# Patient Record
Sex: FEMALE | Race: WHITE | URBAN - METROPOLITAN AREA
[De-identification: names, ages, dates, MRNs, and addresses within clinical notes are randomized per-mention and may not be internally consistent; named-entity substitution may affect disease eponyms.]

---

## 2017-01-19 ENCOUNTER — TRANSFERRED RECORDS (OUTPATIENT)
Dept: HEALTH INFORMATION MANAGEMENT | Facility: CLINIC | Age: 27
End: 2017-01-19

## 2017-01-19 LAB
C TRACH DNA SPEC QL PROBE+SIG AMP: NEGATIVE
N GONORRHOEA DNA SPEC QL PROBE+SIG AMP: NEGATIVE
PAP-ABSTRACT: NORMAL
SPECIMEN DESCRIP: NORMAL
SPECIMEN DESCRIPTION: NORMAL

## 2017-05-09 ENCOUNTER — OFFICE VISIT (OUTPATIENT)
Dept: FAMILY MEDICINE | Facility: CLINIC | Age: 27
End: 2017-05-09
Payer: COMMERCIAL

## 2017-05-09 VITALS
DIASTOLIC BLOOD PRESSURE: 70 MMHG | HEART RATE: 100 BPM | SYSTOLIC BLOOD PRESSURE: 124 MMHG | HEIGHT: 69 IN | WEIGHT: 203.2 LBS | OXYGEN SATURATION: 98 % | TEMPERATURE: 97.2 F | BODY MASS INDEX: 30.1 KG/M2

## 2017-05-09 DIAGNOSIS — Z20.2 EXPOSURE TO GONORRHEA: ICD-10-CM

## 2017-05-09 DIAGNOSIS — R07.0 THROAT PAIN: Primary | ICD-10-CM

## 2017-05-09 LAB
DEPRECATED S PYO AG THROAT QL EIA: NORMAL
MICRO REPORT STATUS: NORMAL
SPECIMEN SOURCE: NORMAL

## 2017-05-09 PROCEDURE — 87491 CHLMYD TRACH DNA AMP PROBE: CPT | Performed by: PHYSICIAN ASSISTANT

## 2017-05-09 PROCEDURE — 99213 OFFICE O/P EST LOW 20 MIN: CPT | Mod: 25 | Performed by: PHYSICIAN ASSISTANT

## 2017-05-09 PROCEDURE — 87081 CULTURE SCREEN ONLY: CPT | Performed by: PHYSICIAN ASSISTANT

## 2017-05-09 PROCEDURE — 87880 STREP A ASSAY W/OPTIC: CPT | Performed by: PHYSICIAN ASSISTANT

## 2017-05-09 PROCEDURE — 87591 N.GONORRHOEAE DNA AMP PROB: CPT | Performed by: PHYSICIAN ASSISTANT

## 2017-05-09 PROCEDURE — 96372 THER/PROPH/DIAG INJ SC/IM: CPT | Performed by: PHYSICIAN ASSISTANT

## 2017-05-09 NOTE — MR AVS SNAPSHOT
After Visit Summary   5/9/2017    Sheba Joiner    MRN: [id number]           Patient Information     Date Of Birth          1990        Visit Information        Provider Department      5/9/2017 8:40 AM Macie Vasquez PA-C [facility] [place]        Today's Diagnoses     Throat pain    -  1    Exposure to gonorrhea           Follow-ups after your visit        Your next 10 appointments already scheduled     May 25, 2017  3:45 PM CDT   Office Visit with Emily Aleman MD   [facility] [place] [place] ([facility] [place])    [address] [place] [id number] [phone]           Bring a current list of meds and any records pertaining to this visit.  For Physicals, please bring immunization records and any forms needing to be filled out.  Please arrive 10 minutes early to complete paperwork.              Who to contact     If you have questions or need follow up information about today's clinic visit or your schedule please contact [facility] [place] directly at [phone].  Normal or non-critical lab and imaging results will be communicated to you by CareSharehart, letter or phone within 4 business days after the clinic has received the results. If you do not hear from us within 7 days, please contact the clinic through [employer] or phone. If you have a critical or abnormal lab result, we will notify you by phone as soon as possible.  Submit refill requests through VuCOMP or call your pharmacy and they will forward the refill request to us. Please allow 3 business days for your refill to be completed.          Additional Information About Your Visit        CareSharehart Information     VuCOMP gives you secure access to your electronic health record. If you see a primary care provider, you can also send messages to your care team and make appointments. If you have questions, please call your primary care clinic.  If you do not have a  "primary care provider, please call [phone] and they will assist you.        Care EveryWhere ID     This is your Care EveryWhere ID. This could be used by other organizations to access your [place] medical records  [id number]        Your Vitals Were     Pulse Temperature Height Pulse Oximetry BMI (Body Mass Index)       100 97.2  F (36.2  C) (Oral) 5' 9\" (1.753 m) 98% 30.01 kg/m2        Blood Pressure from Last 3 Encounters:   05/09/17 124/70   07/11/14 126/84   06/13/14 130/82    Weight from Last 3 Encounters:   05/09/17 203 lb 3.2 oz (92.2 kg)   07/11/14 186 lb 4.8 oz (84.5 kg)   06/13/14 187 lb 6.4 oz (85 kg)              We Performed the Following     Chlamydia trachomatis PCR     Neisseria gonorrhoeae PCR     Strep, Rapid Screen          Today's Medication Changes          These changes are accurate as of: 5/9/17  9:21 AM.  If you have any questions, ask your nurse or doctor.               These medicines have changed or have updated prescriptions.        Dose/Directions    Levonorgestrel 13.5 MG Iud   This may have changed:  additional instructions   Used for:  Encounter for IUD insertion        Dose:  1 Intra Uterine Device   1 each (13.5 mg) by Intrauterine route continuous Inserted today for continuous use to be removed or replaced by July 12, 2014.   Refills:  0                Primary Care Provider    None Specified       No primary provider on file.        Thank you!     Thank you for choosing [facility] [place]  for your care. Our goal is always to provide you with excellent care. Hearing back from our patients is one way we can continue to improve our services. Please take a few minutes to complete the written survey that you may receive in the mail after your visit with us. Thank you!             Your Updated Medication List - Protect others around you: Learn how to safely use, store and throw away your medicines at www.disposemymeds.org.          This list is accurate as of: 5/9/17  " 9:21 AM.  Always use your most recent med list.                   Brand Name Dispense Instructions for use    Levonorgestrel 13.5 MG Iud      1 each (13.5 mg) by Intrauterine route continuous Inserted today for continuous use to be removed or replaced by July 12, 2014.       ranitidine 75 MG tablet   Generic drug:  ranitidine      Take 75 mg by mouth daily as needed.

## 2017-05-09 NOTE — NURSING NOTE
The following medication was given: Ceftriaxone     MEDICATION: Rocephin 250mg and Lidocaine 1cc  ROUTE: IM  SITE: RUQ - Gluteus  DOSE: 250mg  LOT #: [id number]  :  Maryam  EXPIRATION DATE:  06/01/2018  NDC#: 9715-9319-07   Saira Pradhan MA

## 2017-05-09 NOTE — PROGRESS NOTES
SUBJECTIVE:                                                    Sheba Joiner is a 26 year old female who presents to clinic today for the following health issues:    Acute Illness   Acute illness concerns: Sore throat   Onset: Sun      Fever: YES- low grade fever yesterday     Chills/Sweats: no    Headache (location?): no    Sinus Pressure:no    Conjunctivitis:  no    Ear Pain: no    Rhinorrhea: no     Congestion: no     Sore Throat: YES     Cough: no    Wheeze: no    Decreased Appetite: no    Nausea: no    Vomiting: no    Diarrhea:  no    Dysuria/Freq.: no    Fatigue/Achiness: YES    Sick/Strep Exposure: no     Therapies Tried and outcome: nothing     Possible exposure to gonorrhea in throat. No vaginal symptoms -- no discharge, pain with intercourse. Did have vaginal penetration, but with a condom.   Problem list and histories reviewed & adjusted, as indicated.  Additional history: as documented    Patient Active Problem List   Diagnosis     Depression     Encounter for routine gynecological examination     Encounter for GAYLE IUD insertion     Past Surgical History:   Procedure Laterality Date     EXTRACTION(S) DENTAL  age 21       Social History   Substance Use Topics     Smoking status: Never Smoker     Smokeless tobacco: Never Used     Alcohol use Yes      Comment: 2-3 drinks once a  week     Family History   Problem Relation Age of Onset     CEREBROVASCULAR DISEASE Paternal Grandfather 75     CEREBROVASCULAR DISEASE Maternal Grandmother 75     Cardiovascular Father 50     DVT when obese     Hypertension Father 45     resolved w wt loss     CANCER Maternal Grandfather 75     Bladder     DIABETES Father 58     type II     Breast Cancer No family hx of      Cancer - colorectal No family hx of      Prostate Cancer No family hx of      Alcohol/Drug No family hx of      Psychotic Disorder No family hx of          Current Outpatient Prescriptions   Medication Sig Dispense Refill     Levonorgestrel 13.5 MG IUD  "1 each (13.5 mg) by Intrauterine route continuous Inserted today for continuous use to be removed or replaced by July 12, 2014. (Patient taking differently: 1 Intra Uterine Device by Intrauterine route continuous Inserted 7/11/14 for continuous use to be removed or replaced by July 12, 2017, NOT, 2014. ksl)       ranitidine (ZANTAC 75) 75 MG tablet Take 75 mg by mouth daily as needed.       Allergies   Allergen Reactions     Cats      No Known Drug Allergy        Reviewed and updated as needed this visit by clinical staff       Reviewed and updated as needed this visit by Provider       ROS:  Constitutional, HEENT, cardiovascular, pulmonary, GI, , musculoskeletal, neuro, skin, endocrine and psych systems are negative, except as otherwise noted.    OBJECTIVE:                                                    /70  Pulse 100  Temp 97.2  F (36.2  C) (Oral)  Ht 5' 9\" (1.753 m)  Wt 203 lb 3.2 oz (92.2 kg)  SpO2 98%  BMI 30.01 kg/m2  Body mass index is 30.01 kg/(m^2).  GENERAL: healthy, alert and no distress  EYES: Eyes grossly normal to inspection, PERRL and conjunctivae and sclerae normal  HENT: normal cephalic/atraumatic, ear canals and TM's normal, nose and mouth without ulcers or lesions, oral mucous membranes moist, tonsillar hypertrophy and tonsillar erythema  NECK: cervical adenopathy B/L, no asymmetry, masses, or scars and thyroid normal to palpation  RESP: lungs clear to auscultation - no rales, rhonchi or wheezes  CV: regular rate and rhythm, normal S1 S2, no S3 or S4, no murmur, click or rub, no peripheral edema and peripheral pulses strong  ABDOMEN: soft, nontender, no hepatosplenomegaly, no masses and bowel sounds normal  MS: no gross musculoskeletal defects noted, no edema    Diagnostic Test Results:  Results for orders placed or performed in visit on 05/09/17 (from the past 24 hour(s))   Strep, Rapid Screen   Result Value Ref Range    Specimen Description Throat     Rapid Strep A Screen       " NEGATIVE: No Group A streptococcal antigen detected by immunoassay, await   culture report.      Micro Report Status FINAL 05/09/2017         ASSESSMENT/PLAN:                                                    1. Throat pain  Will call if strep culture positive. IBU 600mg THREE TIMES PER DAY, salt water gargles and lots of fluids.  - Strep, Rapid Screen  - CEFTRIAXONE NA INJ /250MG  - INJECTION INTRAMUSCULAR OR SUB-Q  - Beta strep group A culture    2. Exposure to gonorrhea  Will call if G/C positive. Went over safe sex practices. Declines HIV testing at this time.   - Neisseria gonorrhoeae PCR  - Chlamydia trachomatis PCR  - CEFTRIAXONE NA INJ /250MG  - INJECTION INTRAMUSCULAR OR SUB-Q    I have discussed the patient's diagnosis and my plan of treatment with the patient. We went over any labs or imaging. Patient is aware to come back in with worsening symptoms or if no relief despite treatment plan.  Patient verbalizes understanding. All questions were addressed and answered.     Macie Vasquez PA-C  [facility] [place]

## 2017-05-09 NOTE — NURSING NOTE
"Chief Complaint   Patient presents with     URI       Initial /70  Pulse 100  Temp 97.2  F (36.2  C) (Oral)  Ht 5' 9\" (1.753 m)  Wt 203 lb 3.2 oz (92.2 kg)  SpO2 98%  BMI 30.01 kg/m2 Estimated body mass index is 30.01 kg/(m^2) as calculated from the following:    Height as of this encounter: 5' 9\" (1.753 m).    Weight as of this encounter: 203 lb 3.2 oz (92.2 kg).  Medication Reconciliation: complete   Saira Pradhan MA      "

## 2017-05-10 LAB
BACTERIA SPEC CULT: NORMAL
C TRACH DNA SPEC QL NAA+PROBE: NORMAL
MICRO REPORT STATUS: NORMAL
N GONORRHOEA DNA SPEC QL NAA+PROBE: ABNORMAL
SPECIMEN SOURCE: ABNORMAL
SPECIMEN SOURCE: NORMAL
SPECIMEN SOURCE: NORMAL

## 2017-05-23 ENCOUNTER — TELEPHONE (OUTPATIENT)
Dept: NURSING | Facility: CLINIC | Age: 27
End: 2017-05-23

## 2017-05-23 DIAGNOSIS — Z11.3 SCREEN FOR STD (SEXUALLY TRANSMITTED DISEASE): ICD-10-CM

## 2017-05-23 PROCEDURE — 86780 TREPONEMA PALLIDUM: CPT | Performed by: OBSTETRICS & GYNECOLOGY

## 2017-05-23 PROCEDURE — 87389 HIV-1 AG W/HIV-1&-2 AB AG IA: CPT | Performed by: OBSTETRICS & GYNECOLOGY

## 2017-05-23 PROCEDURE — 87491 CHLMYD TRACH DNA AMP PROBE: CPT | Performed by: OBSTETRICS & GYNECOLOGY

## 2017-05-23 PROCEDURE — 36415 COLL VENOUS BLD VENIPUNCTURE: CPT | Performed by: OBSTETRICS & GYNECOLOGY

## 2017-05-23 PROCEDURE — 87591 N.GONORRHOEAE DNA AMP PROB: CPT | Performed by: OBSTETRICS & GYNECOLOGY

## 2017-05-23 NOTE — TELEPHONE ENCOUNTER
Completed [facility] form with pt and faxed to MD.   Pt mentioned that she took the azithromycin x 1 dose and developed a rash on wed and had the rash x 10 days. Rash was on neck and chest. Is now clearing. Used benadryl at night and topical benadryl in the am. Rash was little red bumps with small white heads. Bumps were not really itchy. No other symptoms. She took photos of the rash and will bring to her appt on 5/25. Listed azithromycin as allergy.     Lilliam Rodriguez RN  [facility] [place]

## 2017-05-25 ENCOUNTER — OFFICE VISIT (OUTPATIENT)
Dept: OBGYN | Facility: CLINIC | Age: 27
End: 2017-05-25
Payer: COMMERCIAL

## 2017-05-25 VITALS
SYSTOLIC BLOOD PRESSURE: 126 MMHG | BODY MASS INDEX: 30.24 KG/M2 | WEIGHT: 204.2 LBS | HEIGHT: 69 IN | DIASTOLIC BLOOD PRESSURE: 74 MMHG

## 2017-05-25 DIAGNOSIS — Z30.432 ENCOUNTER FOR IUD REMOVAL: ICD-10-CM

## 2017-05-25 DIAGNOSIS — B96.89 BV (BACTERIAL VAGINOSIS): ICD-10-CM

## 2017-05-25 DIAGNOSIS — Z11.3 SCREEN FOR STD (SEXUALLY TRANSMITTED DISEASE): ICD-10-CM

## 2017-05-25 DIAGNOSIS — Z30.430 ENCOUNTER FOR IUD INSERTION: Primary | ICD-10-CM

## 2017-05-25 DIAGNOSIS — N76.0 BV (BACTERIAL VAGINOSIS): ICD-10-CM

## 2017-05-25 LAB
BETA HCG QUAL IFA URINE: NEGATIVE
C TRACH DNA SPEC QL NAA+PROBE: NORMAL
HIV 1+2 AB+HIV1 P24 AG SERPL QL IA: NORMAL
MICRO REPORT STATUS: ABNORMAL
N GONORRHOEA DNA SPEC QL NAA+PROBE: NORMAL
SPECIMEN SOURCE: ABNORMAL
SPECIMEN SOURCE: NORMAL
SPECIMEN SOURCE: NORMAL
T PALLIDUM IGG+IGM SER QL: NEGATIVE
WET PREP SPEC: ABNORMAL

## 2017-05-25 PROCEDURE — 58300 INSERT INTRAUTERINE DEVICE: CPT | Performed by: OBSTETRICS & GYNECOLOGY

## 2017-05-25 PROCEDURE — 84703 CHORIONIC GONADOTROPIN ASSAY: CPT | Performed by: OBSTETRICS & GYNECOLOGY

## 2017-05-25 PROCEDURE — 58301 REMOVE INTRAUTERINE DEVICE: CPT | Performed by: OBSTETRICS & GYNECOLOGY

## 2017-05-25 PROCEDURE — 87210 SMEAR WET MOUNT SALINE/INK: CPT | Performed by: OBSTETRICS & GYNECOLOGY

## 2017-05-25 PROCEDURE — 99212 OFFICE O/P EST SF 10 MIN: CPT | Mod: 25 | Performed by: OBSTETRICS & GYNECOLOGY

## 2017-05-25 RX ORDER — METRONIDAZOLE 500 MG/1
500 TABLET ORAL 2 TIMES DAILY
Qty: 14 TABLET | Refills: 0 | Status: SHIPPED | OUTPATIENT
Start: 2017-05-25 | End: 2017-06-01

## 2017-05-25 NOTE — MR AVS SNAPSHOT
After Visit Summary   5/25/2017    Sheba Joiner    MRN: [id number]           Patient Information     Date Of Birth          1990        Visit Information        Provider Department      5/25/2017 3:45 PM Emily Aleman [place] [facility] [place]        Today's Diagnoses     Encounter for IUD insertion    -  1    Screen for STD (sexually transmitted disease)          Care Instructions    What Mirena Users May Expect    What to watch for right after Mirena is placed  Some women may experience uterine cramps, bleeding, and/or dizziness during and right after Mirena is placed. To help minimize the cramps, you may taken ibuprofen 600 mg with food prior to your appointment. These symptoms should improve over the next 24 hours.  Mild cramping may be present for a few days after your placement  As a follow up, you should check your strings on 4 weeks or visit your clinic once in the first 4 to 12 weeks after Mirena is placed to make sure it is in the right position. After that, Mirena can be checked once a year as part of your routine exam.    Please use a back-up method (abstinence or condoms) for 5 days after placement.    Your periods may change  For the first 3 to 6 months, your monthly period may become irregular. You may also have frequent spotting or light bleeding. A few women have heavy bleeding during this time. After your body adjusts, the number of bleeding days is likely to decrease (but may remain irregular), and you may even find that your periods stop altogether for as long as Mirena is in place. Around the end of the third month of use, you may see up to a 75% reduction in the amount of menstrual bleeding. By one year, about 1 out of 5 users may hay have no period at all. At the end of two years, 70% have little or no bleeding. Your periods will return rapidly once Mirena is removed.     Mirena Strings  You may check your own Mirena strings by inserting a finger into the  vagina and feeling the strings as they exit the cervix.  The strings will initially feel firm, like fishing line, but will soften over a few weeks.  After the strings have softened, you or your partner should not be able to feel the strings during intercourse.  If you can feel the IUD, see your healthcare provider to have the position confirmed.  You may use tampons with Mirena in place.    Mirena does not protect against HIV or STDs.  Mirena does not prevent the formation of ovarian cysts.  Mirena does not typically reduce acne or cause weight gain or mood changes.    Please call [facility] at [phone] if you have questions or concerns.    For more information:  [email or web address]            Follow-ups after your visit        Who to contact     If you have questions or need follow up information about today's clinic visit or your schedule please contact [facility] [place] directly at [phone].  Normal or non-critical lab and imaging results will be communicated to you by WeMonitorhart, letter or phone within 4 business days after the clinic has received the results. If you do not hear from us within 7 days, please contact the clinic through [employer] or phone. If you have a critical or abnormal lab result, we will notify you by phone as soon as possible.  Submit refill requests through [employer] or call your pharmacy and they will forward the refill request to us. Please allow 3 business days for your refill to be completed.          Additional Information About Your Visit        Styloola Information     [employer] gives you secure access to your electronic health record. If you see a primary care provider, you can also send messages to your care team and make appointments. If you have questions, please call your primary care clinic.  If you do not have a primary care provider, please call [phone] and they will assist you.        Care EveryWhere ID     This is your Care EveryWhere  "ID. This could be used by other organizations to access your [place] medical records  [id number]        Your Vitals Were     Height BMI (Body Mass Index)                5' 9\" (1.753 m) 30.16 kg/m2           Blood Pressure from Last 3 Encounters:   05/25/17 126/74   05/09/17 124/70   07/11/14 126/84    Weight from Last 3 Encounters:   05/25/17 204 lb 3.2 oz (92.6 kg)   05/09/17 203 lb 3.2 oz (92.2 kg)   07/11/14 186 lb 4.8 oz (84.5 kg)              We Performed the Following     Beta HCG qual IFA urine     Wet prep          Today's Medication Changes          These changes are accurate as of: 5/25/17  4:06 PM.  If you have any questions, ask your nurse or doctor.               These medicines have changed or have updated prescriptions.        Dose/Directions    Levonorgestrel 13.5 MG Iud   This may have changed:  additional instructions   Used for:  Encounter for IUD insertion        Dose:  1 Intra Uterine Device   1 each (13.5 mg) by Intrauterine route continuous Inserted today for continuous use to be removed or replaced by July 12, 2014.   Refills:  0                Primary Care Provider    None Specified       No primary provider on file.        Thank you!     Thank you for choosing [facility]  for your care. Our goal is always to provide you with excellent care. Hearing back from our patients is one way we can continue to improve our services. Please take a few minutes to complete the written survey that you may receive in the mail after your visit with us. Thank you!             Your Updated Medication List - Protect others around you: Learn how to safely use, store and throw away your medicines at www.disposemymeds.org.          This list is accurate as of: 5/25/17  4:06 PM.  Always use your most recent med list.                   Brand Name Dispense Instructions for use    Levonorgestrel 13.5 MG Iud      1 each (13.5 mg) by Intrauterine route continuous Inserted today for continuous use to " be removed or replaced by July 12, 2014.       ranitidine 75 MG tablet   Generic drug:  ranitidine      Take 75 mg by mouth daily as needed.

## 2017-05-25 NOTE — NURSING NOTE
"Chief Complaint   Patient presents with     IUD     Mirena. NDC: 53353-725-73 LOT: [id number] EXP: 2020       Initial /74  Ht 5' 9\" (1.753 m)  Wt 204 lb 3.2 oz (92.6 kg)  BMI 30.16 kg/m2 Estimated body mass index is 30.16 kg/(m^2) as calculated from the following:    Height as of this encounter: 5' 9\" (1.753 m).    Weight as of this encounter: 204 lb 3.2 oz (92.6 kg).  BP completed using cuff size: regular        The following HM Due: NONE      The following patient reported/Care Every where data was sent to:  P ABSTRACT QUALITY INITIATIVES [[id number]]  Pap smear done on this date: 17 (approximately), by this group: [facility], results were normal.      patient has appointment for today and Pt had pap done elsewhere. Sent to abstraction/added to history       Rose Pinto CMA        "

## 2017-05-25 NOTE — Clinical Note
Pap smear done on this date: 1/19/17 (approximately), by this group: [facility], results were normal.

## 2017-05-25 NOTE — PATIENT INSTRUCTIONS

## 2017-05-28 NOTE — PROGRESS NOTES
"S:  Sheba presents for IUD remove/replace.   Had a Radha, would like Mirena.  Recent positive gonorrhea test, treated, tested negative two days ago.  Wet prep (screening) positive for BV.    O:  Vitals:    17 1556   BP: 126/74   Weight: 204 lb 3.2 oz (92.6 kg)   Height: 5' 9\" (1.753 m)   Body mass index is 30.16 kg/(m^2).     Gen: well appearing  Abd: soft, NT  : normal external genitalia. Cervix non parous. No abnormal discharge.     Procedure IUD REMOVAL:    Bimanual exam was performed.  The IUD strings were palpable.  Speculum introduced with patient in the dorsal lithotomy position.  The IUD string was visualized and grasped.  The IUD was removed easily.  Pt tolerated well.      IUD INSERTION PROCEDURE    Sheba Joiner is a 26 year old female  who presents for Mirena IUD insertion.  Indication for IUD insertion is contraception.  No LMP recorded. Patient is not currently having periods (Reason: IUD). .  The patient is currently using Radha for contraception.  She is not in a monogamous sexual relationship.     Lab Results   Component Value Date    PAP NIL 2014     GC/CT negative 17  Results for orders placed or performed in visit on 17   Beta HCG qual IFA urine   Result Value Ref Range    Beta HCG Qual IFA Urine Negative NEG   Wet prep   Result Value Ref Range    Specimen Description Vagina     Wet Prep (A)      Clue cells seen  No yeast seen  No Trichomonas seen      Micro Report Status FINAL 2017        A complete discussion of the risks and benefits of IUD use and the details of the insertion procedure was held with the patient.    All questions were answered.  A consent form was signed.    Prior to the beginning of the procedure the team paused to verify the patient's identity, as well as the procedure to be performed and the site.  All equipment required was ready and available. The patient was positioned appropriately.     IUD Lot # NDC: [id number] LOT: " [id number] EXP: 01/2020    The patient was placed in low lithotomy.  A bimanual exam was performed and the uterus noted to be anteverted.  A speculum was placed and the cervix swabbed with Betadine.  A tenaculum was placed on the anterior cervical lip. A paracervical block was performed.  The fundus sounded to 7 cm. The Mirena IUD was placed to the uterine fundus without difficulty.  The strings were cut to 3 cms.  The tenaculum was removed and hemostasis was ensured.  The speculum was removed.  The patient tolerated the procedure well.    PLAN:   The patient was asked to contact the clinic for any fever/chills/severe pelvic or abdominal pain or heavy bleeding. She was instructed in how to palpate her IUD strings.  Flagyl for BV. Counseled on recurrence, side effects.     FOLLOW-UP:  She was asked to follow up prn, and for her routine annual screening.

## 2018-03-25 ENCOUNTER — HEALTH MAINTENANCE LETTER (OUTPATIENT)
Age: 28
End: 2018-03-25

## 2019-04-19 ENCOUNTER — HEALTH MAINTENANCE LETTER (OUTPATIENT)
Age: 29
End: 2019-04-19

## 2019-11-09 ENCOUNTER — HEALTH MAINTENANCE LETTER (OUTPATIENT)
Age: 29
End: 2019-11-09

## 2020-02-23 ENCOUNTER — HEALTH MAINTENANCE LETTER (OUTPATIENT)
Age: 30
End: 2020-02-23

## 2020-12-06 ENCOUNTER — HEALTH MAINTENANCE LETTER (OUTPATIENT)
Age: 30
End: 2020-12-06

## 2021-04-11 ENCOUNTER — HEALTH MAINTENANCE LETTER (OUTPATIENT)
Age: 31
End: 2021-04-11

## 2021-09-26 ENCOUNTER — HEALTH MAINTENANCE LETTER (OUTPATIENT)
Age: 31
End: 2021-09-26

## 2022-01-15 ENCOUNTER — HEALTH MAINTENANCE LETTER (OUTPATIENT)
Age: 32
End: 2022-01-15

## 2023-04-23 ENCOUNTER — HEALTH MAINTENANCE LETTER (OUTPATIENT)
Age: 33
End: 2023-04-23